# Patient Record
Sex: MALE | Race: WHITE | NOT HISPANIC OR LATINO | Employment: FULL TIME | URBAN - METROPOLITAN AREA
[De-identification: names, ages, dates, MRNs, and addresses within clinical notes are randomized per-mention and may not be internally consistent; named-entity substitution may affect disease eponyms.]

---

## 2024-01-23 ENCOUNTER — HOSPITAL ENCOUNTER (EMERGENCY)
Facility: CLINIC | Age: 29
Discharge: HOME | End: 2024-01-23
Attending: STUDENT IN AN ORGANIZED HEALTH CARE EDUCATION/TRAINING PROGRAM | Admitting: STUDENT IN AN ORGANIZED HEALTH CARE EDUCATION/TRAINING PROGRAM
Payer: COMMERCIAL

## 2024-01-23 VITALS
OXYGEN SATURATION: 99 % | HEART RATE: 80 BPM | SYSTOLIC BLOOD PRESSURE: 150 MMHG | DIASTOLIC BLOOD PRESSURE: 110 MMHG | TEMPERATURE: 98.1 F | RESPIRATION RATE: 16 BRPM | HEIGHT: 67 IN

## 2024-01-23 DIAGNOSIS — M54.41 ACUTE RIGHT-SIDED LOW BACK PAIN WITH RIGHT-SIDED SCIATICA: ICD-10-CM

## 2024-01-23 PROCEDURE — 99284 EMERGENCY DEPT VISIT MOD MDM: CPT | Performed by: STUDENT IN AN ORGANIZED HEALTH CARE EDUCATION/TRAINING PROGRAM

## 2024-01-23 PROCEDURE — 250N000013 HC RX MED GY IP 250 OP 250 PS 637: Performed by: STUDENT IN AN ORGANIZED HEALTH CARE EDUCATION/TRAINING PROGRAM

## 2024-01-23 RX ORDER — ACETAMINOPHEN 500 MG
500 TABLET ORAL EVERY 6 HOURS PRN
COMMUNITY

## 2024-01-23 RX ORDER — PREDNISONE 20 MG/1
TABLET ORAL
Qty: 10 TABLET | Refills: 0 | Status: SHIPPED | OUTPATIENT
Start: 2024-01-23

## 2024-01-23 RX ORDER — OXYCODONE AND ACETAMINOPHEN 5; 325 MG/1; MG/1
1 TABLET ORAL EVERY 6 HOURS PRN
Qty: 6 TABLET | Refills: 0 | Status: SHIPPED | OUTPATIENT
Start: 2024-01-23 | End: 2024-01-26

## 2024-01-23 RX ORDER — OXYCODONE AND ACETAMINOPHEN 5; 325 MG/1; MG/1
1 TABLET ORAL ONCE
Status: COMPLETED | OUTPATIENT
Start: 2024-01-23 | End: 2024-01-23

## 2024-01-23 RX ORDER — METHOCARBAMOL 750 MG/1
750 TABLET, FILM COATED ORAL 4 TIMES DAILY PRN
Qty: 15 TABLET | Refills: 0 | Status: SHIPPED | OUTPATIENT
Start: 2024-01-23

## 2024-01-23 RX ADMIN — OXYCODONE HYDROCHLORIDE AND ACETAMINOPHEN 1 TABLET: 5; 325 TABLET ORAL at 09:09

## 2024-01-23 ASSESSMENT — ACTIVITIES OF DAILY LIVING (ADL): ADLS_ACUITY_SCORE: 35

## 2024-01-23 NOTE — ED PROVIDER NOTES
"  History     Chief Complaint   Patient presents with    Back Pain     HPI  Naman Forbes is a 28 year old male with no relevant medical history who presents for evaluation of back pain.  Patient reports waxing and waning right-sided back pain for the past week.  He denies obvious injury or strain.  Discomfort is mostly localized to the right lower back.  It radiates down into the right buttock and shoots down the back of his right leg at times.  Going to the chiropractor and stretching has helped somewhat.  However, last night he reports that the lower back \"locked up\" on him.  This morning the pain was severe enough to the point where he had difficulty walking around.  He was brought to the emergency department by EMS and was given a small dose of fentanyl and route.  This improved pain significantly.  Patient otherwise feels well, denies any fevers, chills, abdominal pain, urinary symptoms, saddle anesthesia, incontinence, focal weakness, other complaints.    Allergies:  Allergies   Allergen Reactions    Amoxicillin        Problem List:    There are no problems to display for this patient.       Past Medical History:    History reviewed. No pertinent past medical history.    Past Surgical History:    Past Surgical History:   Procedure Laterality Date    APPENDECTOMY  11/04/06       Family History:    History reviewed. No pertinent family history.    Social History:  Marital Status:  Single [1]  Social History     Tobacco Use    Smoking status: Never     Passive exposure: Yes    Smokeless tobacco: Never   Substance Use Topics    Alcohol use: Yes     Comment: daily    Drug use: Never        Medications:    acetaminophen (TYLENOL) 500 MG tablet  methocarbamol (ROBAXIN) 750 MG tablet  oxyCODONE-acetaminophen (PERCOCET) 5-325 MG tablet  predniSONE (DELTASONE) 20 MG tablet      Review of Systems   All other systems reviewed and are negative.  See HPI.    Physical Exam   BP: (!) 152/104  Pulse: 81  Temp: 98.1  F (36.7 " " C)  Resp: 20  Height: 170.2 cm (5' 7\")  SpO2: 98 %      Physical Exam  Vitals and nursing note reviewed.   Constitutional:       Appearance: Normal appearance.      Comments: Slightly uncomfortable.  Nontoxic.   HENT:      Head: Normocephalic and atraumatic.   Eyes:      General: No scleral icterus.     Conjunctiva/sclera: Conjunctivae normal.      Pupils: Pupils are equal, round, and reactive to light.   Cardiovascular:      Rate and Rhythm: Normal rate and regular rhythm.      Pulses: Normal pulses.      Heart sounds: Normal heart sounds.   Pulmonary:      Effort: Pulmonary effort is normal. No respiratory distress.      Breath sounds: Normal breath sounds.   Abdominal:      Palpations: Abdomen is soft.      Tenderness: There is no abdominal tenderness. There is no right CVA tenderness, left CVA tenderness, guarding or rebound.   Musculoskeletal:         General: Tenderness present. No deformity or signs of injury. Normal range of motion.      Cervical back: Normal range of motion and neck supple. No rigidity or tenderness.      Comments: Patient has a focal area of tenderness to the paraspinal musculature in the right lumbar region.  No focal midline tenderness or step-off.  No overlying skin changes.  There is also mild tenderness to the upper right buttock region.   Skin:     General: Skin is warm.      Capillary Refill: Capillary refill takes less than 2 seconds.   Neurological:      General: No focal deficit present.      Mental Status: He is alert and oriented to person, place, and time.      Sensory: No sensory deficit.      Motor: No weakness.      Coordination: Coordination normal.      Comments: Positive SLR on the right.  Otherwise normal strength with flexion at the hip and completely normal strength/range of motion distally down the leg.  Sensation is fully intact.  His gait is still somewhat antalgic after medications but he is able to walk without too much difficulty.   Psychiatric:         Mood " and Affect: Mood normal.         ED Course             Procedures                No results found for this or any previous visit (from the past 24 hour(s)).    Medications   oxyCODONE-acetaminophen (PERCOCET) 5-325 MG per tablet 1 tablet (1 tablet Oral $Given 1/23/24 0969)       Assessments & Plan (with Medical Decision Making)     I have reviewed the nursing notes.    I have reviewed the findings, diagnosis, plan and need for follow up with the patient.    Medical Decision Making  Naman Forbes is a 28 year old male with no relevant medical history who presents for evaluation of back pain.  Patient does not, but visibly uncomfortable.  Vitals otherwise normal.  Exam reveals a focal area of paraspinal tenderness in the right lower back.  There is no midline tenderness or any overlying imaging.  He positive SLR on right below, but is otherwise completely normal during the patient on the leg.  Patient has no abdominal tenderness but he denies any urinary symptoms.  Patient is most consistent either a muscle spasm or bulging disc/sciatica.  Since he has limited tenderness and recent injury, no red flag symptoms, I do not think advanced imaging is indicated today.  Very low suspicion for abdominal given reproduced pain and positive SLR.  Will prescribe prednisone.  Prescriptions for a muscle relaxer and short course Percocet were also provided to help with breakthrough symptoms.  Advised that the patient follow-up with primary care soon as possible and that he return to the emergency department anytime any new or acute worsening.    Discharge Medication List as of 1/23/2024  9:14 AM        START taking these medications    Details   methocarbamol (ROBAXIN) 750 MG tablet Take 1 tablet (750 mg) by mouth 4 times daily as needed for muscle spasms, Disp-15 tablet, R-0, E-Prescribe      oxyCODONE-acetaminophen (PERCOCET) 5-325 MG tablet Take 1 tablet by mouth every 6 hours as needed for pain, Disp-6 tablet, R-0, E-Prescribe       predniSONE (DELTASONE) 20 MG tablet Take two tablets (= 40mg) each day for 5 (five) days, Disp-10 tablet, R-0, E-Prescribe             Final diagnoses:   Acute right-sided low back pain with right-sided sciatica       1/23/2024   [facility] EMERGENCY DEPT       Arjun Canela MD  01/24/24 4692

## 2024-01-23 NOTE — ED TRIAGE NOTES
"Pt reports right lower back pain that started about a week ago or more, has been seeing the chiropractor and stretching but last night it \"locked up\".       Triage Assessment (Adult)       Row Name 01/23/24 0837          Triage Assessment    Airway WDL WDL        Respiratory WDL    Respiratory WDL WDL        Skin Circulation/Temperature WDL    Skin Circulation/Temperature WDL WDL        Cardiac WDL    Cardiac WDL WDL        Peripheral/Neurovascular WDL    Peripheral Neurovascular WDL WDL        Cognitive/Neuro/Behavioral WDL    Cognitive/Neuro/Behavioral WDL WDL                     "

## 2024-01-23 NOTE — Clinical Note
Naman Forbes was seen and treated in our emergency department on 1/23/2024.  He may return to work on 01/26/2024.       If you have any questions or concerns, please don't hesitate to call.      Arjun Canela MD

## 2024-01-23 NOTE — ED TRIAGE NOTES
"Pt reports right lower back pain that started about a week ago or more, has been seeing the chiropractor and stretching but last night it \"locked up\"      Triage Assessment (Adult)       Row Name 01/23/24 0821          Triage Assessment    Airway WDL WDL        Respiratory WDL    Respiratory WDL WDL        Skin Circulation/Temperature WDL    Skin Circulation/Temperature WDL WDL        Cardiac WDL    Cardiac WDL WDL        Peripheral/Neurovascular WDL    Peripheral Neurovascular WDL WDL        Cognitive/Neuro/Behavioral WDL    Cognitive/Neuro/Behavioral WDL WDL                     "

## 2024-01-23 NOTE — DISCHARGE INSTRUCTIONS
I think your symptoms are consistent with either a muscle spasm to the back or some degree of sciatica, nerve irritation.  I have very low suspicion for fracture and do not think an x-ray or CT would provide any benefit today.  We will instead try a course of steroids, which should help with the inflammation.  Use Tylenol and ibuprofen over the next several days as well for the same purpose.  I will also prescribe a few separate medications that could help with your pain, including a muscle relaxer and Percocet, use these only as needed.  You should establish care with a primary care doctor soon as possible, in case the back issues become a more long-term problem, referral provided today.  Return to the emergency department immediately in the meantime for any new or acutely worsening symptoms, particularly any severely worsened pain, new numbness in the groin, loss of control of bowel or bladder function.

## 2024-03-16 ENCOUNTER — HEALTH MAINTENANCE LETTER (OUTPATIENT)
Age: 29
End: 2024-03-16

## 2025-03-22 ENCOUNTER — HEALTH MAINTENANCE LETTER (OUTPATIENT)
Age: 30
End: 2025-03-22